# Patient Record
Sex: FEMALE | Employment: OTHER | URBAN - METROPOLITAN AREA
[De-identification: names, ages, dates, MRNs, and addresses within clinical notes are randomized per-mention and may not be internally consistent; named-entity substitution may affect disease eponyms.]

---

## 2019-12-20 ENCOUNTER — TRANSFERRED RECORDS (OUTPATIENT)
Dept: HEALTH INFORMATION MANAGEMENT | Facility: CLINIC | Age: 64
End: 2019-12-20

## 2019-12-20 ENCOUNTER — TELEPHONE (OUTPATIENT)
Dept: ORTHOPEDICS | Facility: CLINIC | Age: 64
End: 2019-12-20

## 2019-12-20 NOTE — TELEPHONE ENCOUNTER
Karina Ortiz from General surgery at [facility] is calling and states that she will bne seeing a patient.  This patient has what was presumed as a lipoma that has changed in sized, almost doubled since 2014.  She has a recent MRI done on  12-04-19.  They will push into PACs for us to have for her 01- 0271 appt with Dr. Humphries.

## 2019-12-24 ENCOUNTER — TRANSCRIBE ORDERS (OUTPATIENT)
Dept: OTHER | Age: 64
End: 2019-12-24

## 2019-12-24 DIAGNOSIS — R22.42 LEG MASS, LEFT: Primary | ICD-10-CM

## 2019-12-27 ENCOUNTER — TELEPHONE (OUTPATIENT)
Dept: ORTHOPEDICS | Facility: CLINIC | Age: 64
End: 2019-12-27

## 2019-12-27 NOTE — TELEPHONE ENCOUNTER
RN called and left a voice message for Ella to call and register herself in the system for her appointment with Dr. Humphries on  01- at 0915,  Referring  Karina Ortiz, General surgery at [facility].   MRI done on  12-04-19

## 2020-01-03 ENCOUNTER — OFFICE VISIT (OUTPATIENT)
Dept: ORTHOPEDICS | Facility: CLINIC | Age: 65
End: 2020-01-03
Payer: MEDICARE

## 2020-01-03 VITALS — BODY MASS INDEX: 25.52 KG/M2 | HEIGHT: 63 IN | WEIGHT: 144 LBS

## 2020-01-03 DIAGNOSIS — D17.9 INTRAMUSCULAR LIPOMA: Primary | ICD-10-CM

## 2020-01-03 RX ORDER — AMOXICILLIN 500 MG/1
CAPSULE ORAL
COMMUNITY
Start: 2019-01-30

## 2020-01-03 RX ORDER — FOLIC ACID 1 MG/1
TABLET ORAL
COMMUNITY
Start: 2019-10-21

## 2020-01-03 RX ORDER — ASPIRIN 81 MG/1
81 TABLET ORAL
COMMUNITY
Start: 2018-12-14

## 2020-01-03 RX ORDER — PROCHLORPERAZINE MALEATE 10 MG
TABLET ORAL
COMMUNITY
Start: 2017-08-07

## 2020-01-03 ASSESSMENT — MIFFLIN-ST. JEOR: SCORE: 1168.43

## 2020-01-03 NOTE — NURSING NOTE
"Reason For Visit:   Chief Complaint   Patient presents with     Consult     left thigh lipoma       Ht 1.61 m (5' 3.39\")   Wt 65.3 kg (144 lb)   BMI 25.20 kg/m      Pain Assessment  Patient Currently in Pain: No(\" no pain, just numb\")    Emy Rivera ATC    "

## 2020-01-03 NOTE — LETTER
1/3/2020       RE: Marissa Hernandez  [address]  [facility] [address]     Dear Colleague,    Thank you for referring your patient, Marissa Hernandez, to the [facility] at [facility]. Please see a copy of my visit note below.        Department of Orthopedic Surgery  Clinic Consultation Note      PATIENT NAME: Marissa Hernandez   MRN: [id number]  AGE: 64 year old  BMI: Body mass index is 25.2 kg/m .  REFERRING PHYSICIAN: Referred Self      CHIEF COMPLAINT: Consult (left thigh lipoma)      HISTORY OF PRESENT ILLNESS:  Marissa Hernandez is a 64 year old female who presents with left thigh mass.  The patient is seen in clinic today with her sister.  She reports a history of a chronic large left thigh mass.  This is been present since at least 2010.  She believes she previously had an ultrasound on this mass in 2014 which was shown to be a solid mass in her anterior thigh.    She mentioned the mass to her primary care physician in November 2019 when she was getting a preop physical for an upper extremity surgery, noting that she felt that the mass was growing in size.  The patient was referred to surgery at [facility], an MRI was obtained.  Given the size and location of the mass the patient was referred to our clinic for further evaluation.    The patient denies any pain for the mass.  She reports some very mild sensory changes over the anterior thigh which she describes as numbness.    She denies any weakness or falls.  No recent illnesses.  No reported fever or chills.      ALLERGIES: Sulfa drugs and Clindamycin    MEDICATIONS:     Current Outpatient Medications:      amoxicillin (AMOXIL) 500 MG capsule, Take 4 tablets 1 hour before dental procedure or cleaning, Disp: , Rfl:      aspirin 81 MG EC tablet, Take 81 mg by mouth, Disp: , Rfl:      etanercept (ENBREL) 50 MG/ML injection, Inject 0.98 mL subcutaneously once every week., Disp: , Rfl:      prochlorperazine  "(COMPAZINE) 10 MG tablet, 10 mg , half an hour before methotrexate for nausea, can be repeated every 8 hours if needed., Disp: , Rfl:      folic acid (FOLVITE) 1 MG tablet, TK 1 T PO D, Disp: , Rfl:      methotrexate 2.5 MG tablet, TK 8 TS PO ONCE A WEEK, Disp: , Rfl:       MEDICAL HISTORY: No past medical history on file.  Rheumatoid arthritis currently takes Enbrel    SURGICAL HISTORY: No past surgical history on file.  Multiple bilateral upper extremity surgical procedures most recently right carpal tunnel release in 2019, bilateral CMC arthroplasties.  Right total knee arthroplasty, left medial unicompartmental knee arthroplasty    No adverse reaction personally to anesthesia    FAMILY HISTORY: No family history on file.    No noted bleeding or clotting conditions    No reported family history of musculoskeletal tumors or additional cancer history.     SOCIAL HISTORY:   Social History     Tobacco Use     Smoking status: Not on file   Substance Use Topics     Alcohol use: Not on file     Review of systems:  A comprehensive review of systems was performed an is negative except as noted above in the HPI    PHYSICAL EXAMINATION:  Ht 1.61 m (5' 3.39\")   Wt 65.3 kg (144 lb)   BMI 25.20 kg/m     5' 3.386\"  Body mass index is 25.2 kg/m .    General: awake, alert, cooperative, no apparent distress, appears stated age  HEENT: normocephalic, atraumatic  Respiratory: breathing non-labored, no wheezing  Cardiovascular: nontachycardic  Skin: no rashes or lesions  Neurological: A&Ox3, no obvious focal neurologic deficit  Pysch: appropriate affect     Musculoskeletal:    Left lower extremity: Warm and well-perfused limb.    Distally motor or sensory intact 5-5 strength in EHL FHL tibialis anterior gastroc and soleus.  4+ out of 5 strength in her quadriceps 5/5 strength in her hamstring.    On inspection there is obvious deformity over the left thigh with a soft tissue mass in the proximal medial thigh that is about 10 cm long " and 5 cm wide.  This mass is nontender.  Is within the muscular layer.  No appreciable dysesthesias over the mass.  Sensation intact light touch over the mass.  Leg is soft and compressible dynamic circulation intact distally warm and well-perfused limb palpable PT.     IMAGING:     MRI: MRI of the left proximal thigh dated 12/7/2019 is intimately reviewed.  This shows evidence of a soft tissue mass within the rectus femoris.  The dimensions are approximately 10 x 5 x 3 cm.  The mass is isointense with subcutaneous fat and homogeneous on T1 and T2 and T1 postcontrast sequences.    ASSESSMENT/PLAN: Marissa Hernandez is a 64 year old female who presents for evaluation of left thigh soft tissue mass within the rectus femoris which is been growing over the past several years.    1. 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    We reviewed with the patient her clinical and radiographic findings today. We discussed that based on our assessment, the patient most likely has a benign lipomatous tumor in her rectus femoris either a simple lipoma or an atypical lipomatous tumor.  We discussed that given the increase in size the patient has noticed over the past year it is reasonable to consider surgical excision.  We discussed that the sample will be sent for pathology.  We discussed the risks and benefits alternatives to surgery.  We discussed the expected postoperative course.  The patient was given an opportunity to ask questions.    She would like to proceed with a surgical excision at mutual convenience.  We will work toward scheduling the patient for this procedure today..     Patient and her sister expressed and good understanding and agreement. All questions answered.        Patient was seen, discussed, and personally evaluated by Dr. Humphries who agrees with the above assessment and plan.     Antoni Knapp MD  Orthopedic Surgery PGY-4           I was present with the resident during the  history and exam.  I discussed the case with the resident and agree with the findings as documented in the assessment and plan.    Again, thank you for allowing me to participate in the care of your patient.      Sincerely,    Darek Humphries MD

## 2020-01-03 NOTE — PROGRESS NOTES
Department of Orthopedic Surgery  Clinic Consultation Note      PATIENT NAME: Marissa Hernandez   MRN: [id number]  AGE: 64 year old  BMI: Body mass index is 25.2 kg/m .  REFERRING PHYSICIAN: Referred Self      CHIEF COMPLAINT: Consult (left thigh lipoma)      HISTORY OF PRESENT ILLNESS:  Marissa Hernandez is a 64 year old female who presents with left thigh mass.  The patient is seen in clinic today with her sister.  She reports a history of a chronic large left thigh mass.  This is been present since at least 2010.  She believes she previously had an ultrasound on this mass in 2014 which was shown to be a solid mass in her anterior thigh.    She mentioned the mass to her primary care physician in November 2019 when she was getting a preop physical for an upper extremity surgery, noting that she felt that the mass was growing in size.  The patient was referred to surgery at [facility], an MRI was obtained.  Given the size and location of the mass the patient was referred to our clinic for further evaluation.    The patient denies any pain for the mass.  She reports some very mild sensory changes over the anterior thigh which she describes as numbness.    She denies any weakness or falls.  No recent illnesses.  No reported fever or chills.      ALLERGIES: Sulfa drugs and Clindamycin    MEDICATIONS:     Current Outpatient Medications:      amoxicillin (AMOXIL) 500 MG capsule, Take 4 tablets 1 hour before dental procedure or cleaning, Disp: , Rfl:      aspirin 81 MG EC tablet, Take 81 mg by mouth, Disp: , Rfl:      etanercept (ENBREL) 50 MG/ML injection, Inject 0.98 mL subcutaneously once every week., Disp: , Rfl:      prochlorperazine (COMPAZINE) 10 MG tablet, 10 mg , half an hour before methotrexate for nausea, can be repeated every 8 hours if needed., Disp: , Rfl:      folic acid (FOLVITE) 1 MG tablet, TK 1 T PO D, Disp: , Rfl:      methotrexate 2.5 MG tablet, TK 8 TS PO ONCE A WEEK, Disp: , Rfl:       MEDICAL  "HISTORY: No past medical history on file.  Rheumatoid arthritis currently takes Enbrel    SURGICAL HISTORY: No past surgical history on file.  Multiple bilateral upper extremity surgical procedures most recently right carpal tunnel release in 2019, bilateral CMC arthroplasties.  Right total knee arthroplasty, left medial unicompartmental knee arthroplasty    No adverse reaction personally to anesthesia    FAMILY HISTORY: No family history on file.    No noted bleeding or clotting conditions    No reported family history of musculoskeletal tumors or additional cancer history.     SOCIAL HISTORY:   Social History     Tobacco Use     Smoking status: Not on file   Substance Use Topics     Alcohol use: Not on file     Review of systems:  A comprehensive review of systems was performed an is negative except as noted above in the HPI    PHYSICAL EXAMINATION:  Ht 1.61 m (5' 3.39\")   Wt 65.3 kg (144 lb)   BMI 25.20 kg/m    5' 3.386\"  Body mass index is 25.2 kg/m .    General: awake, alert, cooperative, no apparent distress, appears stated age  HEENT: normocephalic, atraumatic  Respiratory: breathing non-labored, no wheezing  Cardiovascular: nontachycardic  Skin: no rashes or lesions  Neurological: A&Ox3, no obvious focal neurologic deficit  Pysch: appropriate affect     Musculoskeletal:    Left lower extremity: Warm and well-perfused limb.    Distally motor or sensory intact 5-5 strength in EHL FHL tibialis anterior gastroc and soleus.  4+ out of 5 strength in her quadriceps 5/5 strength in her hamstring.    On inspection there is obvious deformity over the left thigh with a soft tissue mass in the proximal medial thigh that is about 10 cm long and 5 cm wide.  This mass is nontender.  Is within the muscular layer.  No appreciable dysesthesias over the mass.  Sensation intact light touch over the mass.  Leg is soft and compressible dynamic circulation intact distally warm and well-perfused limb palpable PT.     IMAGING: "     MRI: MRI of the left proximal thigh dated 12/7/2019 is intimately reviewed.  This shows evidence of a soft tissue mass within the rectus femoris.  The dimensions are approximately 10 x 5 x 3 cm.  The mass is isointense with subcutaneous fat and homogeneous on T1 and T2 and T1 postcontrast sequences.    ASSESSMENT/PLAN: Marissa Hernandez is a 64 year old female who presents for evaluation of left thigh soft tissue mass within the rectus femoris which is been growing over the past several years.    1. 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    We reviewed with the patient her clinical and radiographic findings today. We discussed that based on our assessment, the patient most likely has a benign lipomatous tumor in her rectus femoris either a simple lipoma or an atypical lipomatous tumor.  We discussed that given the increase in size the patient has noticed over the past year it is reasonable to consider surgical excision.  We discussed that the sample will be sent for pathology.  We discussed the risks and benefits alternatives to surgery.  We discussed the expected postoperative course.  The patient was given an opportunity to ask questions.    She would like to proceed with a surgical excision at mutual convenience.  We will work toward scheduling the patient for this procedure today..     Patient and her sister expressed and good understanding and agreement. All questions answered.        Patient was seen, discussed, and personally evaluated by Dr. Humphries who agrees with the above assessment and plan.     Antoni Knapp MD  Orthopedic Surgery PGY-4

## 2020-01-03 NOTE — NURSING NOTE
Teaching Flowsheet   Relevant Diagnosis: excision left thigh mass  Teaching Topic: preop     Person(s) involved in teaching:   Patient and Daughter     Motivation Level:  Asks Questions: Yes  Eager to Learn: Yes  Cooperative: Yes  Receptive (willing/able to accept information): Yes  Any cultural factors/Hinduism beliefs that may influence understanding or compliance? {NA NO YES,        Patient and Family demonstrates understanding of the following:  Reason for the appointment, diagnosis and treatment plan: Yes  Knowledge of proper use of medications and conditions for which they are ordered (with special attention to potential side effects or drug interactions): Yes  Which situations necessitate calling provider and whom to contact: Yes       Teaching Concerns Addressed:        Proper use and care of soap (medical equip, care aids, etc.): Yes  Nutritional needs and diet plan: Yes  Pain management techniques: Yes  Wound Care: Yes  How and/when to access community resources: NA     Instructional Materials Used/Given: surgery packet reviewed with patient by RN.  They will obtain H&P with PCP.  They have no further questions at this time.  ASA to be stopped 10 days prior to OR

## 2020-01-03 NOTE — LETTER
Date:January 6, 2020      Patient was self referred, no letter generated. Do not send.        [facility] Health Information

## 2020-01-06 ENCOUNTER — DOCUMENTATION ONLY (OUTPATIENT)
Dept: CARE COORDINATION | Facility: CLINIC | Age: 65
End: 2020-01-06

## 2020-01-15 ENCOUNTER — ANESTHESIA EVENT (OUTPATIENT)
Dept: SURGERY | Facility: AMBULATORY SURGERY CENTER | Age: 65
End: 2020-01-15

## 2020-01-16 ENCOUNTER — HOSPITAL ENCOUNTER (OUTPATIENT)
Facility: AMBULATORY SURGERY CENTER | Age: 65
End: 2020-01-16
Attending: ORTHOPAEDIC SURGERY
Payer: MEDICARE

## 2020-01-16 ENCOUNTER — ANESTHESIA (OUTPATIENT)
Dept: SURGERY | Facility: AMBULATORY SURGERY CENTER | Age: 65
End: 2020-01-16

## 2020-01-16 VITALS
OXYGEN SATURATION: 97 % | HEIGHT: 63 IN | BODY MASS INDEX: 25.52 KG/M2 | SYSTOLIC BLOOD PRESSURE: 115 MMHG | DIASTOLIC BLOOD PRESSURE: 82 MMHG | WEIGHT: 144 LBS | HEART RATE: 81 BPM | RESPIRATION RATE: 16 BRPM | TEMPERATURE: 98 F

## 2020-01-16 DIAGNOSIS — D17.9 INTRAMUSCULAR LIPOMA: ICD-10-CM

## 2020-01-16 PROCEDURE — 88304 TISSUE EXAM BY PATHOLOGIST: CPT | Performed by: ORTHOPAEDIC SURGERY

## 2020-01-16 RX ORDER — ONDANSETRON 2 MG/ML
INJECTION INTRAMUSCULAR; INTRAVENOUS PRN
Status: DISCONTINUED | OUTPATIENT
Start: 2020-01-16 | End: 2020-01-16

## 2020-01-16 RX ORDER — ACETAMINOPHEN 325 MG/1
975 TABLET ORAL ONCE
Status: COMPLETED | OUTPATIENT
Start: 2020-01-16 | End: 2020-01-16

## 2020-01-16 RX ORDER — CEFAZOLIN SODIUM 2 G/50ML
2 SOLUTION INTRAVENOUS
Status: COMPLETED | OUTPATIENT
Start: 2020-01-16 | End: 2020-01-16

## 2020-01-16 RX ORDER — NALOXONE HYDROCHLORIDE 0.4 MG/ML
.1-.4 INJECTION, SOLUTION INTRAMUSCULAR; INTRAVENOUS; SUBCUTANEOUS
Status: DISCONTINUED | OUTPATIENT
Start: 2020-01-16 | End: 2020-01-17 | Stop reason: HOSPADM

## 2020-01-16 RX ORDER — DEXAMETHASONE SODIUM PHOSPHATE 4 MG/ML
INJECTION, SOLUTION INTRA-ARTICULAR; INTRALESIONAL; INTRAMUSCULAR; INTRAVENOUS; SOFT TISSUE PRN
Status: DISCONTINUED | OUTPATIENT
Start: 2020-01-16 | End: 2020-01-16

## 2020-01-16 RX ORDER — FENTANYL CITRATE 50 UG/ML
INJECTION, SOLUTION INTRAMUSCULAR; INTRAVENOUS PRN
Status: DISCONTINUED | OUTPATIENT
Start: 2020-01-16 | End: 2020-01-16

## 2020-01-16 RX ORDER — ONDANSETRON 4 MG/1
4 TABLET, ORALLY DISINTEGRATING ORAL EVERY 30 MIN PRN
Status: DISCONTINUED | OUTPATIENT
Start: 2020-01-16 | End: 2020-01-17 | Stop reason: HOSPADM

## 2020-01-16 RX ORDER — ONDANSETRON 2 MG/ML
4 INJECTION INTRAMUSCULAR; INTRAVENOUS EVERY 30 MIN PRN
Status: DISCONTINUED | OUTPATIENT
Start: 2020-01-16 | End: 2020-01-17 | Stop reason: HOSPADM

## 2020-01-16 RX ORDER — OXYCODONE HYDROCHLORIDE 5 MG/1
5-10 TABLET ORAL EVERY 4 HOURS PRN
Qty: 30 TABLET | Refills: 0 | Status: SHIPPED | OUTPATIENT
Start: 2020-01-16 | End: 2020-01-19

## 2020-01-16 RX ORDER — LIDOCAINE HYDROCHLORIDE 20 MG/ML
INJECTION, SOLUTION INFILTRATION; PERINEURAL PRN
Status: DISCONTINUED | OUTPATIENT
Start: 2020-01-16 | End: 2020-01-16

## 2020-01-16 RX ORDER — OXYCODONE HYDROCHLORIDE 5 MG/1
10 TABLET ORAL
Status: CANCELLED | OUTPATIENT
Start: 2020-01-16

## 2020-01-16 RX ORDER — SODIUM CHLORIDE, SODIUM LACTATE, POTASSIUM CHLORIDE, CALCIUM CHLORIDE 600; 310; 30; 20 MG/100ML; MG/100ML; MG/100ML; MG/100ML
INJECTION, SOLUTION INTRAVENOUS CONTINUOUS
Status: DISCONTINUED | OUTPATIENT
Start: 2020-01-16 | End: 2020-01-17 | Stop reason: HOSPADM

## 2020-01-16 RX ORDER — MEPERIDINE HYDROCHLORIDE 25 MG/ML
12.5 INJECTION INTRAMUSCULAR; INTRAVENOUS; SUBCUTANEOUS
Status: DISCONTINUED | OUTPATIENT
Start: 2020-01-16 | End: 2020-01-17 | Stop reason: HOSPADM

## 2020-01-16 RX ORDER — AMOXICILLIN 250 MG
1-2 CAPSULE ORAL 2 TIMES DAILY
Qty: 30 TABLET | Refills: 0 | Status: SHIPPED | OUTPATIENT
Start: 2020-01-16

## 2020-01-16 RX ORDER — ONDANSETRON 4 MG/1
4-8 TABLET, ORALLY DISINTEGRATING ORAL EVERY 8 HOURS PRN
Qty: 4 TABLET | Refills: 0 | Status: SHIPPED | OUTPATIENT
Start: 2020-01-16

## 2020-01-16 RX ORDER — LIDOCAINE 40 MG/G
CREAM TOPICAL
Status: DISCONTINUED | OUTPATIENT
Start: 2020-01-16 | End: 2020-01-17 | Stop reason: HOSPADM

## 2020-01-16 RX ORDER — ACETAMINOPHEN 325 MG/1
650 TABLET ORAL
Status: CANCELLED | OUTPATIENT
Start: 2020-01-16

## 2020-01-16 RX ORDER — KETOROLAC TROMETHAMINE 30 MG/ML
INJECTION, SOLUTION INTRAMUSCULAR; INTRAVENOUS PRN
Status: DISCONTINUED | OUTPATIENT
Start: 2020-01-16 | End: 2020-01-16

## 2020-01-16 RX ORDER — PROPOFOL 10 MG/ML
INJECTION, EMULSION INTRAVENOUS PRN
Status: DISCONTINUED | OUTPATIENT
Start: 2020-01-16 | End: 2020-01-16

## 2020-01-16 RX ORDER — GABAPENTIN 300 MG/1
300 CAPSULE ORAL ONCE
Status: COMPLETED | OUTPATIENT
Start: 2020-01-16 | End: 2020-01-16

## 2020-01-16 RX ORDER — BUPIVACAINE HYDROCHLORIDE 2.5 MG/ML
INJECTION, SOLUTION INFILTRATION; PERINEURAL PRN
Status: DISCONTINUED | OUTPATIENT
Start: 2020-01-16 | End: 2020-01-16 | Stop reason: HOSPADM

## 2020-01-16 RX ORDER — ACETAMINOPHEN 325 MG/1
650 TABLET ORAL EVERY 4 HOURS PRN
Qty: 50 TABLET | Refills: 0 | Status: SHIPPED | OUTPATIENT
Start: 2020-01-16

## 2020-01-16 RX ORDER — CEFAZOLIN SODIUM 1 G/50ML
1 SOLUTION INTRAVENOUS SEE ADMIN INSTRUCTIONS
Status: DISCONTINUED | OUTPATIENT
Start: 2020-01-16 | End: 2020-01-17 | Stop reason: HOSPADM

## 2020-01-16 RX ORDER — FENTANYL CITRATE 50 UG/ML
25-50 INJECTION, SOLUTION INTRAMUSCULAR; INTRAVENOUS
Status: DISCONTINUED | OUTPATIENT
Start: 2020-01-16 | End: 2020-01-17 | Stop reason: HOSPADM

## 2020-01-16 RX ORDER — PROPOFOL 10 MG/ML
INJECTION, EMULSION INTRAVENOUS CONTINUOUS PRN
Status: DISCONTINUED | OUTPATIENT
Start: 2020-01-16 | End: 2020-01-16

## 2020-01-16 RX ADMIN — DEXAMETHASONE SODIUM PHOSPHATE 4 MG: 4 INJECTION, SOLUTION INTRA-ARTICULAR; INTRALESIONAL; INTRAMUSCULAR; INTRAVENOUS; SOFT TISSUE at 13:51

## 2020-01-16 RX ADMIN — KETOROLAC TROMETHAMINE 15 MG: 30 INJECTION, SOLUTION INTRAMUSCULAR; INTRAVENOUS at 13:51

## 2020-01-16 RX ADMIN — CEFAZOLIN SODIUM 2 G: 2 SOLUTION INTRAVENOUS at 13:51

## 2020-01-16 RX ADMIN — FENTANYL CITRATE 25 MCG: 50 INJECTION, SOLUTION INTRAMUSCULAR; INTRAVENOUS at 14:04

## 2020-01-16 RX ADMIN — PROPOFOL 150 MCG/KG/MIN: 10 INJECTION, EMULSION INTRAVENOUS at 13:44

## 2020-01-16 RX ADMIN — FENTANYL CITRATE 50 MCG: 50 INJECTION, SOLUTION INTRAMUSCULAR; INTRAVENOUS at 13:45

## 2020-01-16 RX ADMIN — PROPOFOL 150 MG: 10 INJECTION, EMULSION INTRAVENOUS at 13:46

## 2020-01-16 RX ADMIN — ACETAMINOPHEN 975 MG: 325 TABLET ORAL at 12:27

## 2020-01-16 RX ADMIN — GABAPENTIN 300 MG: 300 CAPSULE ORAL at 12:34

## 2020-01-16 RX ADMIN — LIDOCAINE HYDROCHLORIDE 80 MG: 20 INJECTION, SOLUTION INFILTRATION; PERINEURAL at 13:46

## 2020-01-16 RX ADMIN — ONDANSETRON 4 MG: 2 INJECTION INTRAMUSCULAR; INTRAVENOUS at 13:51

## 2020-01-16 ASSESSMENT — MIFFLIN-ST. JEOR: SCORE: 1166.27

## 2020-01-16 ASSESSMENT — LIFESTYLE VARIABLES: TOBACCO_USE: 1

## 2020-01-16 NOTE — ANESTHESIA POSTPROCEDURE EVALUATION
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       Anesthesia Type:  Not documented  General    Reportable Event: NO     PAIN: Uncomplicated   Sign Out status: Comfortable, Well controlled pain     PONV: No PONV   Sign Out status:  No Nausea or Vomiting     Neuro/Psych: Uneventful perioperative course   Sign Out Status: Preoperative baseline; Age appropriate mentation     Airway/Resp.: Uneventful perioperative course   Sign Out Status: Non labored breathing, age appropriate RR; Resp. Status within EXPECTED Parameters     CV: Uneventful perioperative course   Sign Out status: Appropriate BP and perfusion indices; Appropriate HR/Rhythm     Disposition:   Sign Out in:  PACU  Disposition:  Phase II; Home  Recovery Course: Uneventful  Follow-Up: Not required           Last Anesthesia Record Vitals:  CRNA VITALS  2020 1401 - 2020 1501      2020             Resp Rate (set):  10          Last PACU Vitals:  Vitals Value Taken Time   /82 2020  2:53 PM   Temp 36.4  C (97.6  F) 2020  2:50 PM   Pulse 68 2020  2:53 PM   Resp 10 2020  2:54 PM   SpO2 97 % 2020  2:54 PM   Temp src     NIBP     Pulse     SpO2     Resp     Temp     Ht Rate     Temp 2     Vitals shown include unvalidated device data.      Electronically Signed By: Perez Diallo MD, 2020, 3:25 PM

## 2020-01-16 NOTE — OP NOTE
DATE OF SURGERY: 1/16/2020    PREOPERATIVE DIAGNOSIS: Left thigh intramuscular lipoma    POSTOPERATIVE DIAGNOSIS: Left thigh intramuscular lipoma    PROCEDURE: Excision left thigh mass, deep, greater than 5 cm    SURGEON: Darek Humphries MD     ASSISTANT: Antoni Knapp MD    PATIENT HISTORY: This patient has a long history of a thigh mass that is grown some and is bothering her.  She understands the risk of lymph bleeding infection pain numbness tingling and weakness.    DESCRIPTION OF PROCEDURE: The patient was placed supine after general anesthesia and the left legs washed and sterilely prepped and draped.  We made an incision over the anteromedial thigh sharply through the skin divided the subcutaneous tissue and muscle fascia with cautery.  I then bluntly dissected around the edges of this mass.  I used cautery to divide some of its attachments from the muscle.  We ultimately divided the vascular pedicle.  Some of the lipoma remained stuck to the proximal muscle and so this was removed with a rongeur until there was no visible abnormality left.  We copiously irrigated and cauterized some small bleeding vessels felt that the wound was hemostatic.  The fascia is closed with Vicryl to subcutaneous tissue with Vicryl and the skin with Monocryl followed by Steri-Strips and a sterile dry dressing.  The patient was extubated and taken to the recovery room in stable condition.  The estimated blood loss is less than 10 mL.  I was present for all critical portions of the procedure.    Darek Humphries MD

## 2020-01-16 NOTE — ANESTHESIA CARE TRANSFER NOTE
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    Anesthesia Type:   General     Note:  Airway :Room Air  Patient transferred to:PACU  Handoff Report: Identifed the Patient, Identified the Reponsible Provider, Reviewed the pertinent medical history, Discussed the surgical course, Reviewed Intra-OP anesthesia mangement and issues during anesthesia, Set expectations for post-procedure period and Allowed opportunity for questions and acknowledgement of understanding      Vitals: (Last set prior to Anesthesia Care Transfer)    CRNA VITALS  1/16/2020 1401 - 1/16/2020 1432      1/16/2020             Resp Rate (set):  10                Electronically Signed By: DERIK Estes CRNA  January 16, 2020  2:32 PM

## 2020-01-16 NOTE — BRIEF OP NOTE
[facility]    Brief Operative Note    Pre-operative diagnosis: Intramuscular lipoma [D17.9]  Post-operative diagnosis Same as pre-operative diagnosis    Procedure: Procedure(s):  Excision left thigh mass  Surgeon: Surgeon(s) and Role:     * Darek Humphries MD - Primary     * Antoni Knapp MD - Assisting  Anesthesia: General   Estimated blood loss: Less than 10 ml  Drains: None  Specimens:   ID Type Source Tests Collected by Time Destination   A : Left Thigh Mass Tissue Leg, left SURGICAL PATHOLOGY EXAM Darek Humphries MD 1/16/2020  2:12 PM      Findings:   See dictation.  Complications: None apparent at the conclusion of the procedure.  Implants: * No implants in log *      Same day surgery  discharge when meets pacu criteria  Resume pre procedure diet    Weight bearing and range of motion as tolerated LLE  Leave aquacel in place x 7 days, then remove  May shower with aquacel in place    No routine follow up, we will call patient to review final pathology and answer questions.  Patient to call if concerns about the wound/incision.    Ambulatory for DVT ppx.

## 2020-01-16 NOTE — DISCHARGE INSTRUCTIONS
[facility]  Home Care Following Anesthesia  For 24 hours after surgery:  1. Get plenty of rest.  A responsible adult must stay with you for at least 24 hours after you leave the surgery center.  2. Do not drive or use heavy equipment.  If you have weakness or tingling, don't drive or use heavy equipment until this feeling goes away.   3. Do not drink alcohol.   4. Avoid strenuous or risky activities.  Ask for help when climbing stairs.  5. You may feel lightheaded.  IF so, sit for a few minutes before standing.  Have someone help you get up.   6. If you have nausea (feel sick to your stomach): Drink only clear liquids such as apple juice, ginger ale, broth or 7-Up.  Rest may also help.  Be sure to drink enough fluids.  Move to a regular diet as you feel able.   7. You may have a slight fever.  Call the doctor if your fever is over 100 F (37.7 C) (taken under the tongue) or lasts longer than 24 hours.  8. You may have a dry mouth, a sore throat, muscle aches or trouble sleeping. These should go away after 24 hours.  9. Do not make important or legal decisions.               Tips for taking pain medications  To get the best pain relief possible, remember these points:    Take pain medications as directed, before pain becomes severe.    Pain medication can upset your stomach: taking it with food may help.    Constipation is a common side effect of pain medication. Drink plenty of  fluids.    Eat foods high in fiber. Take a stool softener if recommended by your doctor or pharmacist.    Do not drink alcohol, drive or operate machinery while taking pain medications.    Ask about other ways to control pain, such as with heat, ice or relaxation.    Tylenol/Acetaminophen Consumption  To help encourage the safe use of acetaminophen, the makers of TYLENOL  have lowered the maximum daily dose for single-ingredient Extra Strength TYLENOL  (acetaminophen) products sold in the [place] from 8  pills per day (4,000 mg) to 6 pills per day (3,000 mg). The dosing interval has also changed from 2 pills every 4-6 hours to 2 pills every 6 hours.    If you feel your pain relief is insufficient, you may take Tylenol/Acetaminophen in addition to your narcotic pain medication.     Be careful not to exceed 3,000 mg of Tylenol/Acetaminophen in a 24 hour period from all sources.    If you are taking extra strength Tylenol/acetaminophen (500 mg), the maximum dose is 6 tablets in 24 hours.    If you are taking regular strength acetaminophen (325 mg), the maximum dose is 9 tablets in 24 hours.    Call a doctor for any of the followin. Signs of infection (fever, growing tenderness at the surgery site, a large amount of drainage or bleeding, severe pain, foul-smelling drainage, redness, swelling).  2. It has been over 8 to 10 hours since surgery and you are still not able to urinate (pass water).  3. Headache for over 24 hours.  Your doctor is:       Dr. Darek Humphries, Orthopaedics: [phone]               Or dial [phone] and ask for the resident on call for:  Orthopaedics  For emergency care, call the:  [facility] Emergency Department: [phone] (TTY for hearing impaired: [phone])    Scopolamine Patch- (Absorbed through the skin)    This medicine prevents nausea and vomiting caused by motion sickness or anesthesia.  The medicine is in a patch worn behind the ear.      Do NOT use the Scopolamine Patch if you have glaucoma or are allergic to scopolamine.    How to Use This Medicine:    The patch is applied behind the ear.    Keep the patch dry to prevent it from falling off.  Limit contact with water (no bathing or swimming).      If the patch is loose or falls off throw it away.  You do not need to apply a new patch.    After you take off the patch or if it falls off, wash your hands and the area behind your ear with soap and water.      You can remove the patch tomorrow, or leave on for up to 3  days.    Only one patch should be used at any time.    How to Dispose of This Medicine:    Fold the used patch in half with the sticky sides together. Throw any used patch away so that children or pets cannot get to it. You will also need to throw away old patches after the expiration date has passed.    Keep all medicine away from children and never share your medicine with anyone.    Warnings While Using This Medicine:    This medicine can make you sleepy.  Avoid taking sleeping pills and other medicines that can make you sleepy while the patch is on.    Do not drink alcohol while the patch is on.    This medicine can cause temporary blurring and other vision problems if it comes in contact with the eyes.  This is not serious unless accompanied by eye pain and redness.     This medicine may cause problems with urination. If you have problems with urinating, remove the patch.  If you are unable to urinate, call your doctor.      This medicine may make you dizzy or drowsy. Avoid driving, using machines, or doing anything else that could be dangerous if the patch is on.    This medicine may make you sweat less and cause your body to get too hot. Be careful in hot weather or if you are exercising.    Make sure any doctor or dentist who treats you knows that you have the patch on. This medicine may affect the results of certain medical tests.    Skin burns have been reported at the patch site in several patients wearing an aluminized transdermal system during a magnetic resonance imaging scan (MRI).  Since this patch contains aluminum, it is recommended to remove the patch if you are having an MRI.    Possible Side Effects While Using This Medicine:    Dry mouth    Drowsiness    Temporary blurring of vision and widening of the pupils    Call your doctor right away if you notice any of these side effects:    Allergic reaction: Itching or hives, swelling in your face or hands, swelling or tingling in your mouth or  throat, chest tightness, trouble breathing.    Blurred vision that does not go away after the patch is removed    Confusion or memory loss    Fast,slow, or uneven heartbeat    Lightheadedness, dizziness, drowsiness, or fainting    Seeing, hearing, or feeling things that are not there    Restlessness    Severe eye pain    Trouble urinating    If you notice other side effects that you think are caused by this medicine, call your doctor immediately.        Scopolamine Patch- (Absorbed through the skin)    This medicine prevents nausea and vomiting caused by motion sickness or anesthesia.  The medicine is in a patch worn behind the ear.      Do NOT use the Scopolamine Patch if you have glaucoma or are allergic to scopolamine.    How to Use This Medicine:    The patch is applied behind the ear.    Keep the patch dry to prevent it from falling off.  Limit contact with water (no bathing or swimming).      If the patch is loose or falls off throw it away.  You do not need to apply a new patch.    After you take off the patch or if it falls off, wash your hands and the area behind your ear with soap and water.      You can remove the patch tomorrow, or leave on for up to 3 days.    Only one patch should be used at any time.    How to Dispose of This Medicine:    Fold the used patch in half with the sticky sides together. Throw any used patch away so that children or pets cannot get to it. You will also need to throw away old patches after the expiration date has passed.    Keep all medicine away from children and never share your medicine with anyone.    Warnings While Using This Medicine:    This medicine can make you sleepy.  Avoid taking sleeping pills and other medicines that can make you sleepy while the patch is on.    Do not drink alcohol while the patch is on.    This medicine can cause temporary blurring and other vision problems if it comes in contact with the eyes.  This is not serious unless accompanied by eye  pain and redness.     This medicine may cause problems with urination. If you have problems with urinating, remove the patch.  If you are unable to urinate, call your doctor.      This medicine may make you dizzy or drowsy. Avoid driving, using machines, or doing anything else that could be dangerous if the patch is on.    This medicine may make you sweat less and cause your body to get too hot. Be careful in hot weather or if you are exercising.    Make sure any doctor or dentist who treats you knows that you have the patch on. This medicine may affect the results of certain medical tests.    Skin burns have been reported at the patch site in several patients wearing an aluminized transdermal system during a magnetic resonance imaging scan (MRI).  Since this patch contains aluminum, it is recommended to remove the patch if you are having an MRI.    Possible Side Effects While Using This Medicine:    Dry mouth    Drowsiness    Temporary blurring of vision and widening of the pupils    Call your doctor right away if you notice any of these side effects:    Allergic reaction: Itching or hives, swelling in your face or hands, swelling or tingling in your mouth or throat, chest tightness, trouble breathing.    Blurred vision that does not go away after the patch is removed    Confusion or memory loss    Fast,slow, or uneven heartbeat    Lightheadedness, dizziness, drowsiness, or fainting    Seeing, hearing, or feeling things that are not there    Restlessness    Severe eye pain    Trouble urinating    If you notice other side effects that you think are caused by this medicine, call your doctor immediately.

## 2020-01-16 NOTE — ANESTHESIA PREPROCEDURE EVALUATION
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 No pertinent surgical history.       Anesthesia Evaluation     . Pt has had prior anesthetic.     History of anesthetic complications   - PONV        ROS/MED HX    ENT/Pulmonary:  - neg pulmonary ROS   (+)tobacco use, Current use , . .    Neurologic:  - neg neurologic ROS     Cardiovascular:  - neg cardiovascular ROS       METS/Exercise Tolerance:     Hematologic:  - neg hematologic  ROS       Musculoskeletal: Comment: Rheumatoid arthritis - neg musculoskeletal ROS       GI/Hepatic:  - neg GI/hepatic ROS       Renal/Genitourinary:  - ROS Renal section negative       Endo:  - neg endo ROS       Psychiatric:  - neg psychiatric ROS       Infectious Disease:  - neg infectious disease ROS       Malignancy:      - no malignancy   Other:    - neg other ROS                     PHYSICAL EXAM:   Mental Status/Neuro: A/A/O   Airway: Facies: Feasible  Mallampati: I  Mouth/Opening: Full  TM distance: > 6 cm  Neck ROM: Full   Respiratory: Auscultation: CTAB     Resp. Rate: Normal     Resp. Effort: Normal      CV: Rhythm: Regular  Rate: Age appropriate  Heart: Normal Sounds  Edema: None   Comments:      Dental: Normal Dentition                LABS:  CBC: No results found for: WBC, HGB, HCT, PLT  BMP: No results found for: NA, POTASSIUM, CHLORIDE, CO2, BUN, CR, GLC  COAGS: No results found for: PTT, INR, FIBR  POC: No results found for: BGM, HCG, HCGS  OTHER: No results found for: PH, LACT, A1C, BROOK, PHOS, MAG, ALBUMIN, PROTTOTAL, ALT, AST, GGT, ALKPHOS, BILITOTAL, BILIDIRECT, LIPASE, AMYLASE, MUSA, TSH, T4, T3, CRP, SED     Preop Vitals    BP  "Readings from Last 3 Encounters:   01/16/20 (!) 129/94    Pulse Readings from Last 3 Encounters:   01/16/20 81      Resp Readings from Last 3 Encounters:   01/16/20 16    SpO2 Readings from Last 3 Encounters:   01/16/20 98%      Temp Readings from Last 1 Encounters:   No data found for Temp    Ht Readings from Last 1 Encounters:   01/16/20 1.607 m (5' 3.25\")      Wt Readings from Last 1 Encounters:   01/16/20 65.3 kg (144 lb)    Estimated body mass index is 25.31 kg/m  as calculated from the following:    Height as of this encounter: 1.607 m (5' 3.25\").    Weight as of this encounter: 65.3 kg (144 lb).     LDA:  Airway - Adult/Peds laryngeal mask airway (Active)   Number of days: 0        Assessment:   ASA SCORE: 2    H&P: History and physical reviewed and following examination; no interval change.   Smoking Status:  Non-Smoker/Unknown   NPO Status: NPO Appropriate     Plan:   Anes. Type:  General   Pre-Medication: None   Induction:  IV (Standard)   Airway: LMA   Access/Monitoring: PIV   Maintenance: TIVA     Postop Plan:   Postop Pain: Opioids  Postop Sedation/Airway: Not planned  Disposition: Outpatient     PONV Management:   Adult Risk Factors: Female, H/o PONV or Motion Sickness, Non-Smoker, Postop Opioids   Prevention: Ondansetron, Propofol, No Volatiles     CONSENT: Direct conversation   Plan and risks discussed with: Patient   Blood Products: Consent Deferred (Minimal Blood Loss)                   Perez Diallo MD  "

## 2020-01-20 LAB — COPATH REPORT: NORMAL

## 2020-01-24 ENCOUNTER — TELEPHONE (OUTPATIENT)
Dept: ORTHOPEDICS | Facility: CLINIC | Age: 65
End: 2020-01-24

## 2020-01-24 NOTE — TELEPHONE ENCOUNTER
Called with results, pathology came back as benign lipoma. Patient doing well and incision healing well.  Pt doesn't feel she needs to come back for follow-up.  I agree, and she can certainly call with concerns.  No other f/u or imaging needed.  All questions answered.

## 2020-03-02 ENCOUNTER — TELEPHONE (OUTPATIENT)
Dept: ORTHOPEDICS | Facility: CLINIC | Age: 65
End: 2020-03-02

## 2020-03-02 NOTE — TELEPHONE ENCOUNTER
Patient called in and had some redness and small amount of draining from her incision. She had surgery of left thigh mass excision on the 16th of January. Patient didn't have any fever or heat in the area stated it started a couple days ago. Being she isnt showing any sign of infection I suggested keeping the area that is draining slightly covered and clean as well as icing the area for the redness. She will call back in a couple days with how she is doing and if at any time it gets worse she will give us a call back or go into the ED to be evaluated. She was in agreement with this plan    Nikos Roque ATC

## (undated) DEVICE — PREP DURAPREP REMOVER 4OZ 8611

## (undated) DEVICE — SOL NACL 0.9% IRRIG 1000ML BOTTLE 2F7124

## (undated) DEVICE — SPECIMEN CONTAINER 5OZ STERILE 2600SA

## (undated) DEVICE — ESU ELEC BLADE HEX-LOCKING 2.5" E1450X

## (undated) DEVICE — PREP POVIDONE IODINE SCRUB 7.5% 120ML

## (undated) DEVICE — GLOVE PROTEXIS POWDER FREE SMT 7.0  2D72PT70X

## (undated) DEVICE — GLOVE PROTEXIS BLUE W/NEU-THERA 7.5  2D73EB75

## (undated) DEVICE — PREP DURAPREP 26ML APL 8630

## (undated) DEVICE — PREP SKIN SCRUB TRAY 4461A

## (undated) DEVICE — DRAPE STERI U 1015

## (undated) DEVICE — CAST PADDING 4" UNSTERILE 9044

## (undated) DEVICE — SU VICRYL 2-0 SH 27" UND J417H

## (undated) DEVICE — GLOVE PROTEXIS W/NEU-THERA 7.0  2D73TE70

## (undated) DEVICE — ESU GROUND PAD ADULT W/CORD E7507

## (undated) DEVICE — LINEN ORTHO PACK 5446

## (undated) DEVICE — GOWN XLG DISP 9545

## (undated) DEVICE — SUCTION MANIFOLD NEPTUNE 2 SYS 1 PORT 702-025-000

## (undated) DEVICE — SU MONOCRYL 4-0 PS-2 18" UND Y496G

## (undated) RX ORDER — GABAPENTIN 300 MG/1
CAPSULE ORAL
Status: DISPENSED
Start: 2020-01-16

## (undated) RX ORDER — ONDANSETRON 2 MG/ML
INJECTION INTRAMUSCULAR; INTRAVENOUS
Status: DISPENSED
Start: 2020-01-16

## (undated) RX ORDER — PROPOFOL 10 MG/ML
INJECTION, EMULSION INTRAVENOUS
Status: DISPENSED
Start: 2020-01-16

## (undated) RX ORDER — SCOLOPAMINE TRANSDERMAL SYSTEM 1 MG/1
PATCH, EXTENDED RELEASE TRANSDERMAL
Status: DISPENSED
Start: 2020-01-16

## (undated) RX ORDER — ACETAMINOPHEN 325 MG/1
TABLET ORAL
Status: DISPENSED
Start: 2020-01-16

## (undated) RX ORDER — DEXAMETHASONE SODIUM PHOSPHATE 4 MG/ML
INJECTION, SOLUTION INTRA-ARTICULAR; INTRALESIONAL; INTRAMUSCULAR; INTRAVENOUS; SOFT TISSUE
Status: DISPENSED
Start: 2020-01-16

## (undated) RX ORDER — LIDOCAINE HYDROCHLORIDE 20 MG/ML
INJECTION, SOLUTION EPIDURAL; INFILTRATION; INTRACAUDAL; PERINEURAL
Status: DISPENSED
Start: 2020-01-16

## (undated) RX ORDER — FENTANYL CITRATE 50 UG/ML
INJECTION, SOLUTION INTRAMUSCULAR; INTRAVENOUS
Status: DISPENSED
Start: 2020-01-16

## (undated) RX ORDER — CEFAZOLIN SODIUM 2 G/50ML
SOLUTION INTRAVENOUS
Status: DISPENSED
Start: 2020-01-16

## (undated) RX ORDER — KETOROLAC TROMETHAMINE 30 MG/ML
INJECTION, SOLUTION INTRAMUSCULAR; INTRAVENOUS
Status: DISPENSED
Start: 2020-01-16